# Patient Record
Sex: MALE | Race: WHITE | Employment: UNEMPLOYED | URBAN - METROPOLITAN AREA
[De-identification: names, ages, dates, MRNs, and addresses within clinical notes are randomized per-mention and may not be internally consistent; named-entity substitution may affect disease eponyms.]

---

## 2019-05-26 ENCOUNTER — HOSPITAL ENCOUNTER (EMERGENCY)
Facility: CLINIC | Age: 24
Discharge: HOME | End: 2019-05-26
Attending: INTERNAL MEDICINE | Admitting: INTERNAL MEDICINE
Payer: MEDICAID

## 2019-05-26 ENCOUNTER — APPOINTMENT (OUTPATIENT)
Dept: GENERAL RADIOLOGY | Facility: CLINIC | Age: 24
End: 2019-05-26
Attending: INTERNAL MEDICINE
Payer: MEDICAID

## 2019-05-26 VITALS
SYSTOLIC BLOOD PRESSURE: 128 MMHG | RESPIRATION RATE: 16 BRPM | TEMPERATURE: 96.3 F | OXYGEN SATURATION: 97 % | HEART RATE: 114 BPM | WEIGHT: 140 LBS | DIASTOLIC BLOOD PRESSURE: 87 MMHG

## 2019-05-26 DIAGNOSIS — S61.210A LACERATION OF RIGHT INDEX FINGER WITHOUT FOREIGN BODY WITHOUT DAMAGE TO NAIL, INITIAL ENCOUNTER: ICD-10-CM

## 2019-05-26 PROCEDURE — 73140 X-RAY EXAM OF FINGER(S): CPT | Mod: RT

## 2019-05-26 PROCEDURE — 12001 RPR S/N/AX/GEN/TRNK 2.5CM/<: CPT

## 2019-05-26 PROCEDURE — 99283 EMERGENCY DEPT VISIT LOW MDM: CPT

## 2019-05-26 PROCEDURE — 99283 EMERGENCY DEPT VISIT LOW MDM: CPT | Mod: 25 | Performed by: INTERNAL MEDICINE

## 2019-05-26 PROCEDURE — 12001 RPR S/N/AX/GEN/TRNK 2.5CM/<: CPT | Mod: Z6 | Performed by: INTERNAL MEDICINE

## 2019-05-26 RX ORDER — BUPIVACAINE HYDROCHLORIDE 5 MG/ML
10 INJECTION, SOLUTION EPIDURAL; INTRACAUDAL ONCE
Status: DISCONTINUED | OUTPATIENT
Start: 2019-05-26 | End: 2019-05-27 | Stop reason: HOSPADM

## 2019-05-26 ASSESSMENT — ENCOUNTER SYMPTOMS
SHORTNESS OF BREATH: 0
ABDOMINAL PAIN: 0
NUMBNESS: 0
WOUND: 1
WEAKNESS: 0
FEVER: 0

## 2019-05-26 NOTE — ED AVS SNAPSHOT
[facility], [place], Emergency Department  [address]  [facility] [address]  Phone:  [phone]  Fax:  [phone]                                    Chris Clarke   MRN: [id number]    Department:  [facility], Emergency Department   Date of Visit:  5/26/2019           After Visit Summary Signature Page    I have received my discharge instructions, and my questions have been answered. I have discussed any challenges I see with this plan with the nurse or doctor.    ..........................................................................................................................................  Patient/Patient Representative Signature      ..........................................................................................................................................  Patient Representative Print Name and Relationship to Patient    ..................................................               ................................................  Date                                   Time    ..........................................................................................................................................  Reviewed by Signature/Title    ...................................................              ..............................................  Date                                               Time          22EPIC Rev 08/18

## 2019-05-27 NOTE — DISCHARGE INSTRUCTIONS
Bacitracin tube gauze dressing.  Keep the wound clean and dry.  Have the stitches removed in 7-10 days. Primary clinic or urgent care OK.  Return for any sign if infection such as redness, swelling or pus formation.

## 2019-05-27 NOTE — ED NOTES
Patient presents to ED with right finger laceration and pain; patient states he was working on his car and sliced finger on exhaust pipe of car and later a heavy tool fell on wound and reopened wound; patient describes pain as throbbing; denies numbness and tingling; states he took Ibuprofen and Tylenol at 6:45pm with no relief.

## 2019-05-27 NOTE — ED PROVIDER NOTES
History     Chief Complaint   Patient presents with     Hand Injury     Onset tonight at 630 pm, laceration to right pointer finger by a hernan pipe, bleeding controlled.     HPI  Chris Clarke is a 23 year old male who presents with a flap laceration over the PIP knuckle of his right index finger sustained when his hand slipped while repairing the tailpipe of his car at about 1830 today. The cut keeps pulling open when he bends his finger. He has no numbness or weakness. Last tetanus 2018.    Past Medical History:   Diagnosis Date     NO ACTIVE PROBLEMS        History reviewed. No pertinent surgical history.    No family history on file.    Social History     Tobacco Use     Smoking status: Current Every Day Smoker     Smokeless tobacco: Never Used   Substance Use Topics     Alcohol use: No         I have reviewed the Medications, Allergies, Past Medical and Surgical History, and Social History in the Epic system.    Review of Systems   Constitutional: Negative for fever.   Respiratory: Negative for shortness of breath.    Cardiovascular: Negative for chest pain.   Gastrointestinal: Negative for abdominal pain.   Skin: Positive for wound.   Neurological: Negative for weakness and numbness.   All other systems reviewed and are negative.      Physical Exam   BP: 128/87  Pulse: 114  Heart Rate: 114  Temp: 96.3  F (35.7  C)  Resp: 16  Weight: 63.5 kg (140 lb)  SpO2: 97 %      Physical Exam   Constitutional: He is oriented to person, place, and time. He appears well-developed and well-nourished.   HENT:   Head: Normocephalic.   Eyes: Pupils are equal, round, and reactive to light.   Cardiovascular: Normal rate.   Pulmonary/Chest: Effort normal.   Musculoskeletal:        Right hand: He exhibits decreased range of motion, tenderness and laceration. He exhibits normal two-point discrimination and normal capillary refill. Normal sensation noted. Normal strength noted.        Hands:  Neurological: He is alert and oriented  to person, place, and time. No cranial nerve deficit.   Skin: Skin is warm and dry.   Psychiatric: He has a normal mood and affect. His behavior is normal.   Nursing note and vitals reviewed.      ED Course        Procedures        Labs/Imaging    Results for orders placed or performed during the hospital encounter of 05/26/19 (from the past 24 hour(s))   XR Finger Right 2 Views    Narrative    FINGER(S) TWO-THREE VIEWS RIGHT  5/26/2019 9:17 PM     HISTORY: Flap laceration over PIP dorsum. Arben metal. Evaluate for  foreign body.    COMPARISON: None.    FINDINGS: No radiopaque foreign body demonstrated. There is no acute  fracture or dislocation. There are no worrisome bony lesions.      Impression    IMPRESSION: No acute osseous abnormality demonstrated.     CLARITZA MURRAY MD     [facility] Procedure Note        Laceration Repair:    Performed by: LETTY FERNANDEZ  Authorized by: LETTY FERNANDEZ  Consent given by: Patient who states understanding of the procedure being performed after discussing the risks, benefits and alternatives.    Preparation: Patient was prepped and draped in usual sterile fashion.  Irrigation solution: saline    Body area:right index finger  Laceration length: 2cm  Contamination: The wound is not contaminated.  Foreign bodies:none  Tendon involvement: none  Anesthesia: Digital block  Local anesthetic: Bupivacaine 0.5%  Anesthetic total: 3ml    Debridement: none  Skin closure: Closed with 4 x 5.0 Ethilon  Technique: interrupted  Approximation: close  Approximation difficulty: simple    Patient tolerance: Patient tolerated the procedure well with no immediate complications.    Assessments & Plan (with Medical Decision Making)   Impression:  Flap laceration over the right PIP joint. No foreign bodies. No joint or tendon involvement on exploration. Closed with interrupted sutures.    I have reviewed the nursing notes.    I have reviewed the findings, diagnosis, plan and need for follow up  with the patient.       Medication List      There are no discharge medications for this visit.         Final diagnoses:   Laceration of right index finger without foreign body without damage to nail, initial encounter       5/26/2019   [facility], [place], EMERGENCY DEPARTMENT     Sanket Delgadillo MD  05/26/19 2520       Sanket Delgadillo MD  05/26/19 5250

## 2019-08-10 ENCOUNTER — APPOINTMENT (OUTPATIENT)
Dept: GENERAL RADIOLOGY | Facility: CLINIC | Age: 24
End: 2019-08-10
Attending: EMERGENCY MEDICINE
Payer: COMMERCIAL

## 2019-08-10 ENCOUNTER — HOSPITAL ENCOUNTER (EMERGENCY)
Facility: CLINIC | Age: 24
Discharge: HOME | End: 2019-08-10
Attending: EMERGENCY MEDICINE | Admitting: EMERGENCY MEDICINE
Payer: COMMERCIAL

## 2019-08-10 VITALS
TEMPERATURE: 97.9 F | RESPIRATION RATE: 20 BRPM | WEIGHT: 130 LBS | HEART RATE: 107 BPM | OXYGEN SATURATION: 99 % | SYSTOLIC BLOOD PRESSURE: 113 MMHG | DIASTOLIC BLOOD PRESSURE: 72 MMHG

## 2019-08-10 DIAGNOSIS — S92.302A CLOSED FRACTURE OF METATARSAL BONE OF LEFT FOOT, PHYSEAL INVOLVEMENT UNSPECIFIED, UNSPECIFIED METATARSAL, INITIAL ENCOUNTER: ICD-10-CM

## 2019-08-10 PROCEDURE — 99284 EMERGENCY DEPT VISIT MOD MDM: CPT | Mod: Z6 | Performed by: EMERGENCY MEDICINE

## 2019-08-10 PROCEDURE — 99285 EMERGENCY DEPT VISIT HI MDM: CPT | Mod: 25 | Performed by: EMERGENCY MEDICINE

## 2019-08-10 PROCEDURE — 25000132 ZZH RX MED GY IP 250 OP 250 PS 637: Performed by: EMERGENCY MEDICINE

## 2019-08-10 PROCEDURE — 73630 X-RAY EXAM OF FOOT: CPT | Mod: LT

## 2019-08-10 PROCEDURE — 73590 X-RAY EXAM OF LOWER LEG: CPT | Mod: LT

## 2019-08-10 PROCEDURE — 73610 X-RAY EXAM OF ANKLE: CPT | Mod: LT

## 2019-08-10 PROCEDURE — 29515 APPLICATION SHORT LEG SPLINT: CPT | Performed by: EMERGENCY MEDICINE

## 2019-08-10 RX ORDER — OXYCODONE HYDROCHLORIDE 5 MG/1
5 TABLET ORAL EVERY 6 HOURS PRN
Qty: 12 TABLET | Refills: 0 | Status: SHIPPED | OUTPATIENT
Start: 2019-08-10

## 2019-08-10 RX ORDER — OXYCODONE HYDROCHLORIDE 5 MG/1
5 TABLET ORAL ONCE
Status: COMPLETED | OUTPATIENT
Start: 2019-08-10 | End: 2019-08-10

## 2019-08-10 RX ADMIN — OXYCODONE HYDROCHLORIDE 5 MG: 5 TABLET ORAL at 18:08

## 2019-08-10 NOTE — ED AVS SNAPSHOT
[facility], [place], Emergency Department  [address]  [facility] [address]  Phone:  [phone]  Fax:  [phone]                                    Chris Clarke   MRN: [id number]    Department:  [facility], Emergency Department   Date of Visit:  8/10/2019           After Visit Summary Signature Page    I have received my discharge instructions, and my questions have been answered. I have discussed any challenges I see with this plan with the nurse or doctor.    ..........................................................................................................................................  Patient/Patient Representative Signature      ..........................................................................................................................................  Patient Representative Print Name and Relationship to Patient    ..................................................               ................................................  Date                                   Time    ..........................................................................................................................................  Reviewed by Signature/Title    ...................................................              ..............................................  Date                                               Time          22EPIC Rev 08/18

## 2019-08-10 NOTE — LETTER
August 10, 2019      To Whom It May Concern:      Chris Clarke was seen in our Emergency Department today, 08/10/19.  I expect his condition to improve over the next few days.  He may return to work/school when improved. He will need additional appointments in the outpatient clinic, possibly surgery and therefore he should be excused for those appointments as well.    Sincerely,        Elva Veras MD
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
## 2019-08-11 ASSESSMENT — ENCOUNTER SYMPTOMS
NAUSEA: 0
WEAKNESS: 0
NUMBNESS: 0
NECK PAIN: 0
COLOR CHANGE: 1
DIZZINESS: 0
VOMITING: 0

## 2019-08-11 NOTE — DISCHARGE INSTRUCTIONS
3/5: 3.9, going to lab for venipuncture MH TODAY'S VISIT:  You were seen today for foot pain/swelling.  - You were found to have multiple broken bones in your foot (metatarsals).  Because of the location of these broken bones this could be a relatively serious injury and it is very important that you follow-up closely with the Orthopedic team within the week.  - As we discussed, please immediately return to the Emergency Department with any new or worsening symptoms, especially any worsening pain, numbness, change in temperature or color to the foot, etc.    FOLLOW-UP:  Please make an appointment to follow up with:  - Orthopedics (phone: [phone]) within a week. Call Monday to arrange.   - Have your provider review the results from today's visit with you again to make sure no further follow-up or additional testing is needed based on those results.     PRESCRIPTIONS / MEDICATIONS:  - You can use Ibuprofen (600mg up to every 6-8 hours) and/or Tylenol/acetaminophen (1000mg up to every 8 hours) as needed for pain/symptom control.   - You can use the prescribed oxycodone for pain not controlled by the Tylenol.   --- Oxycodone is a narcotic pain medication. You should not take this medication and use alcohol or other sedating substances or medications. Do not drive, operate heavy machinery, or engage in potentially dangerous activities while on this medication. This medication can also cause constipation and other adverse reactions. If you develop abnormal symptoms stop taking this medication and contact your medical provider.     OTHER INSTRUCTIONS:  - Do NOT bear any weight on your injured foot/leg.   - Use the provided crutches.  - Try to quit smoking/avoid nicotine products.     RETURN TO THE EMERGENCY DEPARTMENT  Return to the Emergency Department at any time for any new or worsening symptoms or any concerns.

## 2019-08-12 NOTE — TELEPHONE ENCOUNTER
RECORDS RECEIVED FROM: Closed fracture of metatarsal bone of left foot, physeal involvement unspecified, unspecified metatarsal, initial encounter  -   F/U after ER 8/10/19 - appt per pt.    DATE RECEIVED: Aug 13, 2019    NOTES STATUS DETAILS   OFFICE NOTE from referring provider N/A    OFFICE NOTE from other specialist N/A    DISCHARGE SUMMARY from hospital N/A    DISCHARGE REPORT from the ER Internal 8/10/19   OPERATIVE REPORT N/A    MEDICATION LIST Internal    IMPLANT RECORD/STICKER N/A    LABS     CBC/DIFF N/A    CULTURES N/A    INJECTIONS DONE IN RADIOLOGY N/A    MRI N/A    CT SCAN N/A    XRAYS (IMAGES & REPORTS) Internal 8/10/19   TUMOR     PATHOLOGY  Slides & report N/A

## 2019-08-13 ENCOUNTER — TELEPHONE (OUTPATIENT)
Dept: ORTHOPEDICS | Facility: CLINIC | Age: 24
End: 2019-08-13

## 2019-08-13 ENCOUNTER — PRE VISIT (OUTPATIENT)
Dept: ORTHOPEDICS | Facility: CLINIC | Age: 24
End: 2019-08-13

## 2019-08-13 ASSESSMENT — ENCOUNTER SYMPTOMS
BACK PAIN: 0
BRUISES/BLEEDS EASILY: 0
JOINT SWELLING: 1

## 2019-08-13 NOTE — TELEPHONE ENCOUNTER
Pt was phoned and voicemail was left that he should be rescheduled to a non-surgeon for his left foot/ankle injury.  Dr Duffy reviewed his case and images and states the pt is not surgical, would be most appropriately served by sports medicine.  Pt was left direct dial number to call back about rescheduling.  Hilda Ring RN  [phone]

## 2019-08-20 ENCOUNTER — PRE VISIT (OUTPATIENT)
Dept: ORTHOPEDICS | Facility: CLINIC | Age: 24
End: 2019-08-20

## 2020-03-25 ENCOUNTER — TELEPHONE (OUTPATIENT)
Dept: FAMILY MEDICINE | Facility: CLINIC | Age: 25
End: 2020-03-25

## 2020-03-25 NOTE — TELEPHONE ENCOUNTER
[facility] phone call message- general phone call:    Reason for call: Patient calling to enroll in the Suboxone Program. Please advise.     Action Desired: Call back    Return call needed: Yes    OK to leave a message on voice mail? Yes    Advised patient response may take up to 2 business days: Yes    Primary language: English      needed? No    Call taken on March 25, 2020 at 12:06 PM by Swati Horton    [facility] to [facility]

## 2020-03-31 NOTE — TELEPHONE ENCOUNTER
3/31/20 Have attempted several times to reach patient for scheduling. Patient not available, no voicemail, no answer.    Kelin Cho  Care Coordinator